# Patient Record
Sex: FEMALE | Race: WHITE | NOT HISPANIC OR LATINO | URBAN - METROPOLITAN AREA
[De-identification: names, ages, dates, MRNs, and addresses within clinical notes are randomized per-mention and may not be internally consistent; named-entity substitution may affect disease eponyms.]

---

## 2017-06-02 ENCOUNTER — APPOINTMENT (OUTPATIENT)
Dept: URBAN - METROPOLITAN AREA CLINIC 200 | Age: 37
Setting detail: DERMATOLOGY
End: 2017-06-02

## 2017-06-02 DIAGNOSIS — D47.02 SYSTEMIC MASTOCYTOSIS: ICD-10-CM

## 2017-06-02 DIAGNOSIS — L57.8 OTHER SKIN CHANGES DUE TO CHRONIC EXPOSURE TO NONIONIZING RADIATION: ICD-10-CM

## 2017-06-02 PROBLEM — Q82.2 CONGENITAL CUTANEOUS MASTOCYTOSIS: Status: ACTIVE | Noted: 2017-06-02

## 2017-06-02 PROCEDURE — OTHER RECOMMENDATIONS: OTHER

## 2017-06-02 PROCEDURE — OTHER ORDER TESTS: OTHER

## 2017-06-02 PROCEDURE — 99213 OFFICE O/P EST LOW 20 MIN: CPT

## 2017-06-02 PROCEDURE — OTHER COUNSELING: OTHER

## 2017-06-02 ASSESSMENT — LOCATION DETAILED DESCRIPTION DERM: LOCATION DETAILED: LEFT PROXIMAL PRETIBIAL REGION

## 2017-06-02 ASSESSMENT — LOCATION ZONE DERM: LOCATION ZONE: LEG

## 2017-06-02 ASSESSMENT — LOCATION SIMPLE DESCRIPTION DERM: LOCATION SIMPLE: LEFT PRETIBIAL REGION

## 2019-03-27 ENCOUNTER — APPOINTMENT (OUTPATIENT)
Dept: URBAN - METROPOLITAN AREA CLINIC 200 | Age: 39
Setting detail: DERMATOLOGY
End: 2019-04-01

## 2019-03-27 DIAGNOSIS — D47.02 SYSTEMIC MASTOCYTOSIS: ICD-10-CM

## 2019-03-27 DIAGNOSIS — L57.8 OTHER SKIN CHANGES DUE TO CHRONIC EXPOSURE TO NONIONIZING RADIATION: ICD-10-CM

## 2019-03-27 DIAGNOSIS — L73.8 OTHER SPECIFIED FOLLICULAR DISORDERS: ICD-10-CM

## 2019-03-27 PROCEDURE — OTHER MIPS QUALITY: OTHER

## 2019-03-27 PROCEDURE — OTHER ORDER TESTS: OTHER

## 2019-03-27 PROCEDURE — OTHER RECOMMENDATIONS: OTHER

## 2019-03-27 PROCEDURE — 99213 OFFICE O/P EST LOW 20 MIN: CPT

## 2019-03-27 PROCEDURE — OTHER COUNSELING: OTHER

## 2019-03-27 ASSESSMENT — LOCATION ZONE DERM
LOCATION ZONE: LEG
LOCATION ZONE: TRUNK

## 2019-03-27 ASSESSMENT — LOCATION DETAILED DESCRIPTION DERM
LOCATION DETAILED: LEFT PROXIMAL PRETIBIAL REGION
LOCATION DETAILED: LEFT MEDIAL SUPERIOR CHEST
LOCATION DETAILED: RIGHT SUPRAPUBIC SKIN

## 2019-03-27 ASSESSMENT — LOCATION SIMPLE DESCRIPTION DERM
LOCATION SIMPLE: GROIN
LOCATION SIMPLE: CHEST
LOCATION SIMPLE: LEFT PRETIBIAL REGION

## 2019-03-27 NOTE — PROCEDURE: RECOMMENDATIONS
Detail Level: Simple
Recommendation Preamble: Zaditor or Visene allergy
Recommendations (Free Text): Jimmy

## 2019-03-27 NOTE — PROCEDURE: MIPS QUALITY
Detail Level: Simple
Quality 110: Preventive Care And Screening: Influenza Immunization: Influenza Immunization not Administered due to Patient Allergy.

## 2021-01-13 ENCOUNTER — APPOINTMENT (OUTPATIENT)
Dept: URBAN - METROPOLITAN AREA CLINIC 200 | Age: 41
Setting detail: DERMATOLOGY
End: 2021-01-31

## 2021-01-13 DIAGNOSIS — L65.9 NONSCARRING HAIR LOSS, UNSPECIFIED: ICD-10-CM

## 2021-01-13 DIAGNOSIS — L57.8 OTHER SKIN CHANGES DUE TO CHRONIC EXPOSURE TO NONIONIZING RADIATION: ICD-10-CM

## 2021-01-13 DIAGNOSIS — D47.02 SYSTEMIC MASTOCYTOSIS: ICD-10-CM

## 2021-01-13 PROCEDURE — 99214 OFFICE O/P EST MOD 30 MIN: CPT

## 2021-01-13 PROCEDURE — OTHER COUNSELING: OTHER

## 2021-01-13 PROCEDURE — OTHER PRESCRIPTION: OTHER

## 2021-01-13 PROCEDURE — OTHER MIPS QUALITY: OTHER

## 2021-01-13 PROCEDURE — OTHER ORDER TESTS: OTHER

## 2021-01-13 ASSESSMENT — LOCATION ZONE DERM
LOCATION ZONE: TRUNK
LOCATION ZONE: SCALP
LOCATION ZONE: LEG

## 2021-01-13 ASSESSMENT — LOCATION DETAILED DESCRIPTION DERM
LOCATION DETAILED: LEFT MEDIAL SUPERIOR CHEST
LOCATION DETAILED: LEFT RIB CAGE
LOCATION DETAILED: POSTERIOR MID-PARIETAL SCALP
LOCATION DETAILED: RIGHT ANTERIOR DISTAL THIGH

## 2021-01-13 ASSESSMENT — LOCATION SIMPLE DESCRIPTION DERM
LOCATION SIMPLE: ABDOMEN
LOCATION SIMPLE: CHEST
LOCATION SIMPLE: POSTERIOR SCALP
LOCATION SIMPLE: RIGHT THIGH

## 2021-01-15 RX ORDER — EPINEPHRINE 0.3 MG/.3ML
INJECTION INTRAMUSCULAR; SUBCUTANEOUS
Qty: 1 | Refills: 0 | Status: ERX | COMMUNITY
Start: 2021-01-15

## 2022-08-22 ENCOUNTER — APPOINTMENT (OUTPATIENT)
Dept: URBAN - METROPOLITAN AREA CLINIC 203 | Age: 42
Setting detail: DERMATOLOGY
End: 2022-08-24

## 2022-08-22 DIAGNOSIS — D22 MELANOCYTIC NEVI: ICD-10-CM

## 2022-08-22 DIAGNOSIS — D47.02 SYSTEMIC MASTOCYTOSIS: ICD-10-CM

## 2022-08-22 DIAGNOSIS — L57.8 OTHER SKIN CHANGES DUE TO CHRONIC EXPOSURE TO NONIONIZING RADIATION: ICD-10-CM

## 2022-08-22 DIAGNOSIS — Z11.52 ENCOUNTER FOR SCREENING FOR COVID-19: ICD-10-CM

## 2022-08-22 PROBLEM — D22.5 MELANOCYTIC NEVI OF TRUNK: Status: ACTIVE | Noted: 2022-08-22

## 2022-08-22 PROCEDURE — OTHER SUNSCREEN RECOMMENDATIONS: OTHER

## 2022-08-22 PROCEDURE — OTHER SCREENING FOR COVID-19: OTHER

## 2022-08-22 PROCEDURE — OTHER COUNSELING: OTHER

## 2022-08-22 PROCEDURE — OTHER ORDER TESTS: OTHER

## 2022-08-22 PROCEDURE — OTHER REASSURANCE: OTHER

## 2022-08-22 PROCEDURE — 99213 OFFICE O/P EST LOW 20 MIN: CPT

## 2022-08-22 ASSESSMENT — LOCATION ZONE DERM: LOCATION ZONE: TRUNK

## 2022-08-22 ASSESSMENT — LOCATION DETAILED DESCRIPTION DERM
LOCATION DETAILED: EPIGASTRIC SKIN
LOCATION DETAILED: LEFT SUPERIOR MEDIAL UPPER BACK
LOCATION DETAILED: RIGHT MEDIAL UPPER BACK
LOCATION DETAILED: LEFT MEDIAL BREAST 10-11:00 REGION
LOCATION DETAILED: LEFT MEDIAL BREAST 11-12:00 REGION

## 2022-08-22 ASSESSMENT — LOCATION SIMPLE DESCRIPTION DERM
LOCATION SIMPLE: RIGHT UPPER BACK
LOCATION SIMPLE: LEFT BREAST
LOCATION SIMPLE: ABDOMEN
LOCATION SIMPLE: LEFT UPPER BACK

## 2022-08-22 NOTE — PROCEDURE: ORDER TESTS
Expected Date Of Service: 08/22/2022
Billing Type: Third-Party Bill
Lab Facility: 0
Performing Laboratory: [id number]
Bill For Surgical Tray: no

## 2022-11-04 ENCOUNTER — TRANSCRIBE ORDERS (OUTPATIENT)
Dept: REGISTRATION | Age: 42
End: 2022-11-04

## 2022-11-04 ENCOUNTER — HOSPITAL ENCOUNTER (OUTPATIENT)
Dept: RADIOLOGY | Age: 42
Discharge: HOME | End: 2022-11-04
Attending: STUDENT IN AN ORGANIZED HEALTH CARE EDUCATION/TRAINING PROGRAM
Payer: COMMERCIAL

## 2022-11-04 DIAGNOSIS — Z12.31 ENCOUNTER FOR SCREENING MAMMOGRAM FOR MALIGNANT NEOPLASM OF BREAST: Primary | ICD-10-CM

## 2022-11-04 DIAGNOSIS — Z12.31 ENCOUNTER FOR SCREENING MAMMOGRAM FOR MALIGNANT NEOPLASM OF BREAST: ICD-10-CM

## 2022-11-04 PROCEDURE — 77067 SCR MAMMO BI INCL CAD: CPT

## 2023-12-15 ENCOUNTER — HOSPITAL ENCOUNTER (OUTPATIENT)
Dept: RADIOLOGY | Age: 43
Discharge: HOME | End: 2023-12-15
Attending: FAMILY MEDICINE
Payer: COMMERCIAL

## 2023-12-15 ENCOUNTER — TRANSCRIBE ORDERS (OUTPATIENT)
Dept: RADIOLOGY | Age: 43
End: 2023-12-15

## 2023-12-15 DIAGNOSIS — Z12.31 ENCOUNTER FOR SCREENING MAMMOGRAM FOR MALIGNANT NEOPLASM OF BREAST: ICD-10-CM

## 2023-12-15 DIAGNOSIS — Z12.31 ENCOUNTER FOR SCREENING MAMMOGRAM FOR MALIGNANT NEOPLASM OF BREAST: Primary | ICD-10-CM

## 2023-12-15 PROCEDURE — 77067 SCR MAMMO BI INCL CAD: CPT

## 2023-12-20 ENCOUNTER — TRANSCRIBE ORDERS (OUTPATIENT)
Dept: REGISTRATION | Facility: HOSPITAL | Age: 43
End: 2023-12-20

## 2024-03-14 ENCOUNTER — APPOINTMENT (OUTPATIENT)
Dept: URBAN - METROPOLITAN AREA CLINIC 203 | Age: 44
Setting detail: DERMATOLOGY
End: 2024-03-18

## 2024-03-14 DIAGNOSIS — Z12.83 ENCOUNTER FOR SCREENING FOR MALIGNANT NEOPLASM OF SKIN: ICD-10-CM

## 2024-03-14 DIAGNOSIS — D47.02 SYSTEMIC MASTOCYTOSIS: ICD-10-CM

## 2024-03-14 DIAGNOSIS — L57.8 OTHER SKIN CHANGES DUE TO CHRONIC EXPOSURE TO NONIONIZING RADIATION: ICD-10-CM

## 2024-03-14 PROCEDURE — OTHER SUNSCREEN RECOMMENDATIONS: OTHER

## 2024-03-14 PROCEDURE — OTHER COUNSELING: OTHER

## 2024-03-14 PROCEDURE — OTHER PRESCRIPTION MEDICATION MANAGEMENT: OTHER

## 2024-03-14 PROCEDURE — OTHER ORDER TESTS: OTHER

## 2024-03-14 PROCEDURE — 99214 OFFICE O/P EST MOD 30 MIN: CPT

## 2024-03-14 PROCEDURE — OTHER REASSURANCE: OTHER

## 2024-03-14 ASSESSMENT — LOCATION DETAILED DESCRIPTION DERM
LOCATION DETAILED: LEFT MEDIAL BREAST 10-11:00 REGION
LOCATION DETAILED: LEFT MEDIAL BREAST 11-12:00 REGION
LOCATION DETAILED: PERIUMBILICAL SKIN
LOCATION DETAILED: LEFT MEDIAL SUPERIOR CHEST
LOCATION DETAILED: EPIGASTRIC SKIN

## 2024-03-14 ASSESSMENT — LOCATION SIMPLE DESCRIPTION DERM
LOCATION SIMPLE: ABDOMEN
LOCATION SIMPLE: LEFT BREAST
LOCATION SIMPLE: CHEST

## 2024-03-14 ASSESSMENT — LOCATION ZONE DERM: LOCATION ZONE: TRUNK

## 2024-03-14 NOTE — PROCEDURE: PRESCRIPTION MEDICATION MANAGEMENT
Detail Level: Zone
Render In Strict Bullet Format?: No
Initiate Treatment: Allegra 180 mg QAM\\nZyrtec QHS

## 2024-03-14 NOTE — PROCEDURE: ORDER TESTS
Lab Facility: 0
Bill For Surgical Tray: no
Performing Laboratory: [id number]
Billing Type: Third-Party Bill
Expected Date Of Service: 03/14/2024

## 2024-07-23 ENCOUNTER — APPOINTMENT (EMERGENCY)
Dept: RADIOLOGY | Facility: HOSPITAL | Age: 44
End: 2024-07-23
Payer: COMMERCIAL

## 2024-07-23 ENCOUNTER — HOSPITAL ENCOUNTER (EMERGENCY)
Facility: HOSPITAL | Age: 44
Discharge: HOME | End: 2024-07-23
Attending: EMERGENCY MEDICINE
Payer: COMMERCIAL

## 2024-07-23 VITALS
OXYGEN SATURATION: 98 % | SYSTOLIC BLOOD PRESSURE: 126 MMHG | RESPIRATION RATE: 16 BRPM | DIASTOLIC BLOOD PRESSURE: 81 MMHG | HEART RATE: 68 BPM | TEMPERATURE: 97.8 F

## 2024-07-23 DIAGNOSIS — G43.109 OCULAR MIGRAINE: ICD-10-CM

## 2024-07-23 DIAGNOSIS — R51.9 ACUTE NONINTRACTABLE HEADACHE, UNSPECIFIED HEADACHE TYPE: Primary | ICD-10-CM

## 2024-07-23 LAB
ALBUMIN SERPL-MCNC: 4.4 G/DL (ref 3.5–5.7)
ALP SERPL-CCNC: 54 IU/L (ref 34–125)
ALT SERPL-CCNC: 15 IU/L (ref 7–52)
ANION GAP SERPL CALC-SCNC: 8 MEQ/L (ref 3–15)
AST SERPL-CCNC: 14 IU/L (ref 13–39)
BASOPHILS # BLD: 0.08 K/UL (ref 0.01–0.1)
BASOPHILS NFR BLD: 0.6 %
BILIRUB SERPL-MCNC: 0.6 MG/DL (ref 0.3–1.2)
BUN SERPL-MCNC: 14 MG/DL (ref 7–25)
CALCIUM SERPL-MCNC: 9 MG/DL (ref 8.6–10.3)
CHLORIDE SERPL-SCNC: 102 MEQ/L (ref 98–107)
CO2 SERPL-SCNC: 25 MEQ/L (ref 21–31)
CREAT SERPL-MCNC: 0.9 MG/DL (ref 0.6–1.2)
DIFFERENTIAL METHOD BLD: ABNORMAL
EGFRCR SERPLBLD CKD-EPI 2021: >60 ML/MIN/1.73M*2
EOSINOPHIL # BLD: 0.3 K/UL (ref 0.04–0.36)
EOSINOPHIL NFR BLD: 2.2 %
ERYTHROCYTE [DISTWIDTH] IN BLOOD BY AUTOMATED COUNT: 12.5 % (ref 11.7–14.4)
GLUCOSE BLD-MCNC: 86 MG/DL (ref 70–99)
GLUCOSE SERPL-MCNC: 89 MG/DL (ref 70–99)
HCG UR QL: NEGATIVE
HCT VFR BLD AUTO: 41.3 % (ref 35–45)
HGB BLD-MCNC: 13.6 G/DL (ref 11.8–15.7)
IMM GRANULOCYTES # BLD AUTO: 0.04 K/UL (ref 0–0.08)
IMM GRANULOCYTES NFR BLD AUTO: 0.3 %
LYMPHOCYTES # BLD: 3.8 K/UL (ref 1.2–3.5)
LYMPHOCYTES NFR BLD: 27.9 %
MCH RBC QN AUTO: 29.1 PG (ref 28–33.2)
MCHC RBC AUTO-ENTMCNC: 32.9 G/DL (ref 32.2–35.5)
MCV RBC AUTO: 88.2 FL (ref 83–98)
MONOCYTES # BLD: 0.86 K/UL (ref 0.28–0.8)
MONOCYTES NFR BLD: 6.3 %
NEUTROPHILS # BLD: 8.53 K/UL (ref 1.7–7)
NEUTS SEG NFR BLD: 62.7 %
NRBC BLD-RTO: 0 %
PDW BLD AUTO: 9.6 FL (ref 9.4–12.3)
PLATELET # BLD AUTO: 407 K/UL (ref 150–369)
POCT TEST: NORMAL
POTASSIUM SERPL-SCNC: 4.4 MEQ/L (ref 3.5–5.1)
PROT SERPL-MCNC: 7.8 G/DL (ref 6–8.2)
RBC # BLD AUTO: 4.68 M/UL (ref 3.93–5.22)
SODIUM SERPL-SCNC: 135 MEQ/L (ref 136–145)
WBC # BLD AUTO: 13.61 K/UL (ref 3.8–10.5)

## 2024-07-23 PROCEDURE — 99284 EMERGENCY DEPT VISIT MOD MDM: CPT | Mod: 25

## 2024-07-23 PROCEDURE — 85025 COMPLETE CBC W/AUTO DIFF WBC: CPT | Performed by: PHYSICIAN ASSISTANT

## 2024-07-23 PROCEDURE — 3E0337Z INTRODUCTION OF ELECTROLYTIC AND WATER BALANCE SUBSTANCE INTO PERIPHERAL VEIN, PERCUTANEOUS APPROACH: ICD-10-PCS | Performed by: EMERGENCY MEDICINE

## 2024-07-23 PROCEDURE — 96375 TX/PRO/DX INJ NEW DRUG ADDON: CPT

## 2024-07-23 PROCEDURE — 70450 CT HEAD/BRAIN W/O DYE: CPT

## 2024-07-23 PROCEDURE — 25800000 HC PHARMACY IV SOLUTIONS: Performed by: PHYSICIAN ASSISTANT

## 2024-07-23 PROCEDURE — 96374 THER/PROPH/DIAG INJ IV PUSH: CPT

## 2024-07-23 PROCEDURE — 84703 CHORIONIC GONADOTROPIN ASSAY: CPT | Performed by: PHYSICIAN ASSISTANT

## 2024-07-23 PROCEDURE — 36415 COLL VENOUS BLD VENIPUNCTURE: CPT | Performed by: PHYSICIAN ASSISTANT

## 2024-07-23 PROCEDURE — 63600000 HC DRUGS/DETAIL CODE: Mod: JZ | Performed by: PHYSICIAN ASSISTANT

## 2024-07-23 PROCEDURE — 3E033GC INTRODUCTION OF OTHER THERAPEUTIC SUBSTANCE INTO PERIPHERAL VEIN, PERCUTANEOUS APPROACH: ICD-10-PCS | Performed by: EMERGENCY MEDICINE

## 2024-07-23 PROCEDURE — 80053 COMPREHEN METABOLIC PANEL: CPT | Performed by: PHYSICIAN ASSISTANT

## 2024-07-23 PROCEDURE — 96361 HYDRATE IV INFUSION ADD-ON: CPT

## 2024-07-23 RX ORDER — DIPHENHYDRAMINE HCL 50 MG/ML
25 VIAL (ML) INJECTION ONCE
Status: COMPLETED | OUTPATIENT
Start: 2024-07-23 | End: 2024-07-23

## 2024-07-23 RX ORDER — BUPROPION HCL 300 MG
TABLET, EXTENDED RELEASE 24 HR ORAL
COMMUNITY

## 2024-07-23 RX ORDER — ESCITALOPRAM OXALATE 20 MG/1
TABLET, FILM COATED ORAL
COMMUNITY

## 2024-07-23 RX ORDER — BUTALBITAL, ACETAMINOPHEN AND CAFFEINE 50; 325; 40 MG/1; MG/1; MG/1
1 TABLET ORAL EVERY 4 HOURS PRN
Qty: 12 TABLET | Refills: 0 | Status: SHIPPED | OUTPATIENT
Start: 2024-07-23

## 2024-07-23 RX ADMIN — DIPHENHYDRAMINE HYDROCHLORIDE 25 MG: 50 INJECTION INTRAMUSCULAR; INTRAVENOUS at 13:46

## 2024-07-23 RX ADMIN — SODIUM CHLORIDE 1000 ML: 9 INJECTION, SOLUTION INTRAVENOUS at 13:46

## 2024-07-23 RX ADMIN — METOCLOPRAMIDE 10 MG: 5 INJECTION, SOLUTION INTRAMUSCULAR; INTRAVENOUS at 13:47

## 2024-07-23 ASSESSMENT — ENCOUNTER SYMPTOMS
NECK PAIN: 0
HEADACHES: 1
PARESTHESIAS: 0
DIZZINESS: 0
WEAKNESS: 0
EYE PAIN: 0
FEVER: 0
FOCAL WEAKNESS: 0
TREMORS: 0
VISUAL CHANGE: 1
ABDOMINAL PAIN: 0
MYALGIAS: 0
TINGLING: 0
BACK PAIN: 0
NECK STIFFNESS: 0
LOSS OF BALANCE: 0
NAUSEA: 1
SEIZURES: 0
DIARRHEA: 0
COUGH: 0
NUMBNESS: 0
PHOTOPHOBIA: 0

## 2024-07-23 NOTE — DISCHARGE INSTRUCTIONS
Your workup was reassuring.  Your symptoms sound like bad of an ocular migraine.  It is important that you follow-up with neurology for further evaluation.  Take medication as needed for headache.  Return to the emergency department for any worsening symptoms.

## 2024-07-23 NOTE — ED PROVIDER NOTES
Emergency Medicine Note  HPI   HISTORY OF PRESENT ILLNESS     44-year-old female with a history of headaches presents to the emergency department stating she has had a headache for the past 5 days.  She states it feels different than her normal headaches in that she is having pressure on the top of her head.  She has had nausea but denies vomiting.  She states today she was sitting at her computer when she noticed blurry vision and flashing lights in her right visual field.  She states everything looked sparkly and shimmery.  This lasted approximately 45 minutes and is almost completely resolved.  She denies paresthesias to the upper or lower extremities.  She denies fever.  She denies neck pain or back pain.  She states the pain gets worse with certain movements.  She has never been evaluated by a neurologist.  She typically takes Tylenol for headaches due to the NSAID allergy.      History provided by:  Patient   used: No    Eye Problem  Location:  Right eye  Associated symptoms: headaches and nausea    Associated symptoms: no numbness, no photophobia and no weakness    Headache  Pain location:  Generalized  Radiates to:  Does not radiate  Onset quality:  Gradual  Duration:  5 days  Timing:  Constant  Progression:  Waxing and waning  Chronicity:  New  Similar to prior headaches: no    Relieved by:  Nothing  Worsened by:  Activity  Associated symptoms: nausea and visual change    Associated symptoms: no abdominal pain, no back pain, no congestion, no cough, no diarrhea, no dizziness, no eye pain, no facial pain, no fever, no focal weakness, no hearing loss, no loss of balance, no myalgias, no neck pain, no neck stiffness, no numbness, no paresthesias, no photophobia, no seizures, no tingling and no weakness          Patient History   PAST HISTORY     Reviewed from Nursing Triage:       History reviewed. No pertinent past medical history.    History reviewed. No pertinent surgical  history.    History reviewed. No pertinent family history.    Social History     Tobacco Use    Smoking status: Never    Smokeless tobacco: Never   Substance Use Topics    Alcohol use: Never    Drug use: Never         Review of Systems   REVIEW OF SYSTEMS     Review of Systems   Constitutional:  Negative for fever.   HENT:  Negative for congestion and hearing loss.    Eyes:  Positive for visual disturbance. Negative for photophobia and pain.   Respiratory:  Negative for cough.    Gastrointestinal:  Positive for nausea. Negative for abdominal pain and diarrhea.   Musculoskeletal:  Negative for back pain, myalgias, neck pain and neck stiffness.   Neurological:  Positive for headaches. Negative for dizziness, tremors, focal weakness, seizures, weakness, numbness, paresthesias and loss of balance.   All other systems reviewed and are negative.        VITALS     ED Vitals      Date/Time Temp Pulse Resp BP SpO2 [facility]   07/23/24 1543 -- 68 16 126/81 98 % KP   07/23/24 1301 36.6 °C (97.8 °F) 74 16 124/79 97 % BL          Pulse Ox %: 97 % (07/23/24 1308)  Pulse Ox Interpretation: Normal (07/23/24 1308)           Physical Exam   PHYSICAL EXAM     Physical Exam  Vitals and nursing note reviewed.   Constitutional:       Appearance: Normal appearance.   HENT:      Head: Normocephalic and atraumatic.      Mouth/Throat:      Mouth: Mucous membranes are moist.      Pharynx: Oropharynx is clear.   Eyes:      Extraocular Movements: Extraocular movements intact.      Conjunctiva/sclera: Conjunctivae normal.      Pupils: Pupils are equal, round, and reactive to light.   Cardiovascular:      Rate and Rhythm: Normal rate and regular rhythm.      Pulses: Normal pulses.      Heart sounds: Normal heart sounds.   Pulmonary:      Effort: Pulmonary effort is normal.      Breath sounds: Normal breath sounds.   Musculoskeletal:         General: Normal range of motion.      Cervical back: Normal range of motion and neck supple. No rigidity or  tenderness.   Skin:     General: Skin is warm and dry.      Capillary Refill: Capillary refill takes less than 2 seconds.   Neurological:      General: No focal deficit present.      Mental Status: She is alert and oriented to person, place, and time.   Psychiatric:         Mood and Affect: Mood normal.           PROCEDURES     Procedures     DATA     Results       Procedure Component Value Units Date/Time    Comprehensive metabolic panel [[id number]]  (Abnormal) Collected: 07/23/24 1311    Specimen: Blood, Venous Updated: 07/23/24 1340     Sodium 135 mEQ/L      Potassium 4.4 mEQ/L      Comment: Results obtained on plasma. Plasma Potassium values may be up to 0.4 mEQ/L less than serum values. The differences may be greater for patients with high platelet or white cell counts.        Chloride 102 mEQ/L      CO2 25 mEQ/L      BUN 14 mg/dL      Creatinine 0.9 mg/dL      Glucose 89 mg/dL      Calcium 9.0 mg/dL      AST (SGOT) 14 IU/L      ALT (SGPT) 15 IU/L      Alkaline Phosphatase 54 IU/L      Total Protein 7.8 g/dL      Comment: Test performed on plasma which typically contains approximately 0.4 g/dL more protein than serum.        Albumin 4.4 g/dL      Bilirubin, Total 0.6 mg/dL      eGFR >60.0 mL/min/1.73m*2      Comment: Calculation based on the Chronic Kidney Disease Epidemiology Collaboration (CKD-EPI) equation refit without adjustment for race.        Anion Gap 8 mEQ/L     BhCG, Serum, Qual [[id number]]  (Normal) Collected: 07/23/24 1311    Specimen: Blood, Venous Updated: 07/23/24 1336     Preg Test, Serum Negative    CBC and differential [[id number]]  (Abnormal) Collected: 07/23/24 1311    Specimen: Blood, Venous Updated: 07/23/24 1319     WBC 13.61 K/uL      RBC 4.68 M/uL      Hemoglobin 13.6 g/dL      Hematocrit 41.3 %      MCV 88.2 fL      MCH 29.1 pg      MCHC 32.9 g/dL      RDW 12.5 %      Platelets 407 K/uL      MPV 9.6 fL      Differential Type Auto     nRBC 0.0 %      Immature Granulocytes 0.3 %       Neutrophils 62.7 %      Lymphocytes 27.9 %      Monocytes 6.3 %      Eosinophils 2.2 %      Basophils 0.6 %      Immature Granulocytes, Absolute 0.04 K/uL      Neutrophils, Absolute 8.53 K/uL      Lymphocytes, Absolute 3.80 K/uL      Monocytes, Absolute 0.86 K/uL      Eosinophils, Absolute 0.30 K/uL      Basophils, Absolute 0.08 K/uL             Imaging Results              CT HEAD WITHOUT IV CONTRAST (Final result)  Result time 07/23/24 13:50:11      Final result                   Impression:    IMPRESSION: No evidence of hemorrhage, mass or acute territorial infarction by  CT criteria.      COMMENT:    Technique: Computed tomography of the brain was performed utilizing contiguous  2.5 mm transaxial sections without intravenous contrast administration.    CT DOSE:  One or more dose reduction techniques (e.g. automated exposure  control, adjustment of the mA and/or kV according to patient size, use of  iterative reconstruction technique) utilized for this examination.    Comparison studies: MR brain dated 10/12/2016.    Postsurgical change: None.    Brain parenchyma: There is no intraparenchymal hemorrhage, mass effect, midline  shift or extra-axial fluid collection.  White matter changes: Normal for age  Ventricles, cisterns, and sulci: Normal in size and configuration.  Sella: Normal in appearance.  Cerebellar tonsils: Normal.      Calvarium and extra cranial soft tissues: Normal.  Paranasal sinuses: Clear bilaterally.  Mastoid air cell: Normal.  Orbits: Normal.                   Narrative:    CLINICAL HISTORY: headache                                      No orders to display       Scoring tools                                  ED Course & MDM   MDM / ED COURSE / CLINICAL IMPRESSION / DISPO     Medical Decision Making  44-year-old female with a history of headaches, never evaluated by a neurologist who presents to the emergency department with a headache for the past 5 days.  She states it feels different than  previous and today she had what sounds like a scotoma which she describes as shimmering lights in her right visual field.  She states it lasted approximately 45 minutes and resolved.  She was given Reglan and Benadryl with fluids and states her symptoms have almost completely resolved.  Her workup is reassuring.  CT head normal.  Will give follow-up with her neurologist as well as Fioricet as needed for pain.  Advise close follow-up.  Return to the emergency department for worsening symptoms.  She is comfortable with plan.    Problems Addressed:  Acute nonintractable headache, unspecified headache type: acute illness or injury  Ocular migraine: acute illness or injury    Amount and/or Complexity of Data Reviewed  Labs: ordered. Decision-making details documented in ED Course.  Radiology: ordered. Decision-making details documented in ED Course.    Risk  OTC drugs.  Prescription drug management.        ED Course as of 07/23/24 1547   Tue Jul 23, 2024   1507 CT HEAD WITHOUT IV CONTRAST  IMPRESSION: No evidence of hemorrhage, mass or acute territorial infarction by  CT criteria.      [CB]   1524 WBC(!): 13.61 [LK]   1524 Hemoglobin: 13.6 [LK]   1524 Potassium, Bld: 4.4 [LK]   1524 Preg Test, Serum: Negative [LK]   1529 On reevaluation patient states her headache is almost completely gone.  Visual changes have resolved as well.  Will give her follow-up with neurology and a prescription for Fioricet. [CB]      ED Course User Index  [CB] Linette Victro PA  [LK] Jerilyn Sauceda MD     Clinical Impression      Acute nonintractable headache, unspecified headache type   Ocular migraine     _________________       ED Disposition   Discharge                       Linette Victor PA  07/23/24 2013

## 2024-07-23 NOTE — ED ATTESTATION NOTE
ED ATTENDING ATTESTATION NOTE  7/23/2024, 3:23 PM    I supervised the care provided by the PA (Valente). We have discussed the case. I have personally seen and examined Madeleine Hoskins.  I personally performed the key components of the encounter and have been involved in the care and medical decision making for this patient.    I reviewed and agree with the PA's assessment and plan of care, with any exceptions as documented below.      My focused history, examination, assessment, and plan of care of Madeleine Hoskins is as follows:    Brief History:  The patient presents with headache x 5 days and R eye visual disturbance PTA. The pt. does have a h/o migraines, but this headache is different since it is located at the vertex. Today she experienced sparkling flashes of light in her R eye, which prompted her to come to the ED. The visual symptoms lased ~45 minutes and have since resolved spontaneously. No numbness/weakness of the face or extremities.     Vital Signs Review: Vital signs have been reviewed. The initial oxygen saturation is SpO2: 97 % on room air. Interpretation: normal  ED Vitals      Date/Time Temp Pulse Resp BP SpO2 [facility]   07/23/24 1301 36.6 °C (97.8 °F) 74 16 124/79 97 % BL          Focused Physical Exam:   Constitutional:    Comments: Appears in no acute distress  HENT:   Head: Normocephalic and atraumatic.   Cardiovascular:   Rate and Rhythm: Normal rate and regular rhythm.   Heart sounds: Normal heart sounds.   Pulmonary:   Effort: Pulmonary effort is normal. No respiratory distress.   Neurological:   Mental Status: Alert and oriented to person, place, and time. Mental status is at baseline. Grossly nonfocal.    Assessment / Plan / MDM:  Visual disturbance in R eye today in a pt. with a h/o migraines and an atypical headache x 5 days. CBC, CMP, beta-hCG Qual, IV fluids, IV Benadryl, IV Reglan, CT head.  Reevaluate.    Medical Decision Making  Problems Addressed:  Acute nonintractable headache,  unspecified headache type: acute illness or injury  Ocular migraine: acute illness or injury    Amount and/or Complexity of Data Reviewed  Labs: ordered. Decision-making details documented in ED Course.  Radiology: ordered. Decision-making details documented in ED Course.    Risk  Prescription drug management.    NOTE: The patient was seen during a time of increased patient volume, and increased boarding in ED which often surpasses departmental capacity.  These challenges may have contributed to lengthened stay in the ED, and a portion of the patient's management and initial evaluation may have been done while in the waiting room.  In addition, this document was created using dragon dictation software. There might be some typographical errors due to this technology. S25           Jerilyn Sauceda MD  07/26/24 [id number]